# Patient Record
Sex: FEMALE | Race: WHITE
[De-identification: names, ages, dates, MRNs, and addresses within clinical notes are randomized per-mention and may not be internally consistent; named-entity substitution may affect disease eponyms.]

---

## 2019-07-08 ENCOUNTER — HOSPITAL ENCOUNTER (EMERGENCY)
Dept: HOSPITAL 11 - JP.ED | Age: 64
Discharge: OTHER INSTITUTION | End: 2019-07-08
Payer: OTHER GOVERNMENT

## 2019-07-08 DIAGNOSIS — Z79.899: ICD-10-CM

## 2019-07-08 DIAGNOSIS — Z88.5: ICD-10-CM

## 2019-07-08 DIAGNOSIS — Z88.8: ICD-10-CM

## 2019-07-08 DIAGNOSIS — K85.10: Primary | ICD-10-CM

## 2019-07-08 DIAGNOSIS — Z79.82: ICD-10-CM

## 2019-07-08 PROCEDURE — 87040 BLOOD CULTURE FOR BACTERIA: CPT

## 2019-07-08 PROCEDURE — 96376 TX/PRO/DX INJ SAME DRUG ADON: CPT

## 2019-07-08 PROCEDURE — 96375 TX/PRO/DX INJ NEW DRUG ADDON: CPT

## 2019-07-08 PROCEDURE — 82150 ASSAY OF AMYLASE: CPT

## 2019-07-08 PROCEDURE — 85025 COMPLETE CBC W/AUTO DIFF WBC: CPT

## 2019-07-08 PROCEDURE — 83690 ASSAY OF LIPASE: CPT

## 2019-07-08 PROCEDURE — 80053 COMPREHEN METABOLIC PANEL: CPT

## 2019-07-08 PROCEDURE — 96365 THER/PROPH/DIAG IV INF INIT: CPT

## 2019-07-08 PROCEDURE — 84484 ASSAY OF TROPONIN QUANT: CPT

## 2019-07-08 PROCEDURE — 99285 EMERGENCY DEPT VISIT HI MDM: CPT

## 2019-07-08 PROCEDURE — 83605 ASSAY OF LACTIC ACID: CPT

## 2019-07-08 PROCEDURE — 74177 CT ABD & PELVIS W/CONTRAST: CPT

## 2019-07-08 PROCEDURE — 36415 COLL VENOUS BLD VENIPUNCTURE: CPT

## 2019-07-08 NOTE — EDM.PDOC
ED HPI GENERAL MEDICAL PROBLEM





- General


Chief Complaint: Gastrointestinal Problem


Stated Complaint: VOMITING


Time Seen by Provider: 07/08/19 01:37


Source of Information: Reports: Patient


History Limitations: Reports: No Limitations





- History of Present Illness


INITIAL COMMENTS - FREE TEXT/NARRATIVE: 





63-year-old female arrives with severe upper epigastric and right upper 

quadrant abdominal pain for the past 4 hours. She is crying, extremely 

uncomfortable and having difficulty walking. She appears to be 

hyperventilating. It started when she was vomiting 2-3 hours after eating 

supper at 6 PM and since that time has had intractable severe pain. The 

symptoms started gradually. She thinks the pain is from "throwing up so hard". 

She has no chest pain, shortness of breath, palpitations or fever. She had some 

diarrhea yesterday, no bowel movement today. She feels her heartburn is 

"terrible".


Onset: Gradual


Duration: Hour(s): (4-5 hours)


Location: Reports: Abdomen


Associated Symptoms: Reports: Malaise, Nausea/Vomiting.  Denies: Chest Pain, 

Cough, Weakness


  ** epigastric/ abd


Pain Score (Numeric/FACES): 10





- Related Data


 Allergies











Allergy/AdvReac Type Severity Reaction Status Date / Time


 


acetaminophen [From Percocet] Allergy  Hives Verified 07/08/19 01:45


 


oxycodone [From Percocet] Allergy  Hives Verified 07/08/19 01:45











Home Meds: 


 Home Meds





Aspirin [Halfprin] 81 mg PO DAILY 07/08/19 [History]


Cholecalciferol (Vitamin D3) [Vitamin D3] 1,000 unit PO DAILY 07/08/19 [History]


Ibuprofen [Motrin] 600 mg PO Q6H PRN 07/08/19 [History]


Metoprolol Succinate [Toprol Xl] 50 mg PO DAILY 07/08/19 [History]


Olopatadine HCl [Pazeo] 1 drop TOP DAILY 07/08/19 [History]


Pantoprazole Sodium [Protonix] 40 mg PO DAILY 07/08/19 [History]


Simvastatin 20 mg PO BEDTIME 07/08/19 [History]


traZODone HCl [Trazodone HCl] 100 mg PO BEDTIME 07/08/19 [History]











ED ROS GENERAL





- Review of Systems


Review Of Systems: See Below


Constitutional: Reports: Malaise.  Denies: Fever, Chills


HEENT: Reports: No Symptoms


Respiratory: Denies: Shortness of Breath


Cardiovascular: Reports: Chest Pain (Epigastric pain)


GI/Abdominal: Reports: Abdominal Pain, Nausea, Vomiting.  Denies: Black Stool, 

Constipation, Diarrhea


: Reports: No Symptoms


Skin: Reports: Pallor, Diaphoresis


Neurological: Reports: No Symptoms





ED EXAM, GI/ABD





- Physical Exam


Exam: See Below


Exam Limited By: No Limitations


General Appearance: Alert, Severe Distress


Eyes: Bilateral: Normal Appearance (No obvious jaundice, good hydration)


Head: Atraumatic


Respiratory/Chest: No Respiratory Distress, Lungs Clear


Cardiovascular: Regular Rate, Rhythm.  No: Tachycardia


GI/Abdominal Exam: Guarding, Rebound, Tender, Other (Severe tenderness to 

palpation across the epigastric area with guarding and rebound)


Extremities: Normal Inspection.  No: Pedal Edema


Skin Exam: Warm, Other (Diaphoretic)





Course





- Vital Signs


Last Recorded V/S: 


 Last Vital Signs











Temp  97.1 F   07/08/19 01:41


 


Pulse  95   07/08/19 03:16


 


Resp  24 H  07/08/19 03:16


 


BP  154/100 H  07/08/19 03:16


 


Pulse Ox  97   07/08/19 03:16














- Orders/Labs/Meds


Orders: 


 Active Orders 24 hr











 Category Date Time Status


 


 CULTURE BLOOD [BC] Urgent Lab  07/08/19 03:05 Received


 


 CULTURE BLOOD [BC] Urgent Lab  07/08/19 03:10 Received


 


 Blood Culture x2 Reflex Set [OM.PC] Urgent Oth  07/08/19 03:00 Ordered











Labs: 


 Laboratory Tests











  07/08/19 07/08/19 07/08/19 Range/Units





  02:05 02:05 02:05 


 


WBC  13.7 H    (4.5-11.0)  K/uL


 


RBC  5.58 H    (3.30-5.50)  M/uL


 


Hgb  16.5 H    (12.0-15.0)  g/dL


 


Hct  48.8 H    (36.0-48.0)  %


 


MCV  88    (80-98)  fL


 


MCH  30    (27-31)  pg


 


MCHC  34    (32-36)  %


 


Plt Count  266    (150-400)  K/uL


 


Neut % (Auto)  83 H    (36-66)  %


 


Lymph % (Auto)  13 L    (24-44)  %


 


Mono % (Auto)  4    (2-6)  %


 


Eos % (Auto)  0 L    (2-4)  %


 


Baso % (Auto)  0    (0-1)  %


 


Sodium   141   (140-148)  mmol/L


 


Potassium   3.7   (3.6-5.2)  mmol/L


 


Chloride   103   (100-108)  mmol/L


 


Carbon Dioxide   27   (21-32)  mmol/L


 


Anion Gap   11.2   (5.0-14.0)  mmol/L


 


BUN   11   (7-18)  mg/dL


 


Creatinine   1.1 H   (0.6-1.0)  mg/dL


 


Est Cr Clr Drug Dosing   46.38   mL/min


 


Estimated GFR (MDRD)   50 L   (>60)  


 


Glucose   225 H   ()  mg/dL


 


Lactic Acid    3.9 H  (0.4-2.0)  mmol/L


 


Calcium   9.2   (8.5-10.1)  mg/dL


 


Total Bilirubin   2.1 H   (0.2-1.0)  mg/dL


 


AST   169 H   (15-37)  U/L


 


ALT   89 H   (12-78)  U/L


 


Alkaline Phosphatase   145 H   ()  U/L


 


Troponin I     (0.000-0.056)  ng/mL


 


Total Protein   7.1   (6.4-8.2)  g/dL


 


Albumin   3.8   (3.4-5.0)  g/dL


 


Globulin   3.3   (2.3-3.5)  g/dL


 


Albumin/Globulin Ratio   1.2   (1.2-2.2)  


 


Amylase   2453 H   ()  U/L


 


Lipase   33497 H   ()  U/L














  07/08/19 Range/Units





  02:05 


 


WBC   (4.5-11.0)  K/uL


 


RBC   (3.30-5.50)  M/uL


 


Hgb   (12.0-15.0)  g/dL


 


Hct   (36.0-48.0)  %


 


MCV   (80-98)  fL


 


MCH   (27-31)  pg


 


MCHC   (32-36)  %


 


Plt Count   (150-400)  K/uL


 


Neut % (Auto)   (36-66)  %


 


Lymph % (Auto)   (24-44)  %


 


Mono % (Auto)   (2-6)  %


 


Eos % (Auto)   (2-4)  %


 


Baso % (Auto)   (0-1)  %


 


Sodium   (140-148)  mmol/L


 


Potassium   (3.6-5.2)  mmol/L


 


Chloride   (100-108)  mmol/L


 


Carbon Dioxide   (21-32)  mmol/L


 


Anion Gap   (5.0-14.0)  mmol/L


 


BUN   (7-18)  mg/dL


 


Creatinine   (0.6-1.0)  mg/dL


 


Est Cr Clr Drug Dosing   mL/min


 


Estimated GFR (MDRD)   (>60)  


 


Glucose   ()  mg/dL


 


Lactic Acid   (0.4-2.0)  mmol/L


 


Calcium   (8.5-10.1)  mg/dL


 


Total Bilirubin   (0.2-1.0)  mg/dL


 


AST   (15-37)  U/L


 


ALT   (12-78)  U/L


 


Alkaline Phosphatase   ()  U/L


 


Troponin I  < 0.017  (0.000-0.056)  ng/mL


 


Total Protein   (6.4-8.2)  g/dL


 


Albumin   (3.4-5.0)  g/dL


 


Globulin   (2.3-3.5)  g/dL


 


Albumin/Globulin Ratio   (1.2-2.2)  


 


Amylase   ()  U/L


 


Lipase   ()  U/L











Meds: 


Medications














Discontinued Medications














Generic Name Dose Route Start Last Admin





  Trade Name Freq  PRN Reason Stop Dose Admin


 


Hydromorphone HCl  0.5 mg  07/08/19 01:45  07/08/19 01:48





  Dilaudid  IVPUSH  07/08/19 01:46  0.5 mg





  ONETIME ONE   Administration





     





     





     





     


 


Hydromorphone HCl  0.5 mg  07/08/19 02:49  07/08/19 02:53





  Dilaudid  IVPUSH  07/08/19 02:50  0.5 mg





  ONETIME ONE   Administration





     





     





     





     


 


Hydromorphone HCl  Confirm  07/08/19 02:50  07/08/19 02:53





  Dilaudid  Administered  07/08/19 02:51  Not Given





  Dose   





  0.5 mg   





  .ROUTE   





  .STK-MED ONE   





     





     





     





     


 


Sodium Chloride  1,000 mls @ 1,000 mls/hr  07/08/19 02:15  07/08/19 02:39





  Normal Saline  IV   1,000 mls/hr





  ASDIRECTED LUIS ALBERTO   Administration





     





     





     





     


 


Sodium Chloride  83 mls @ 4 mls/sec  07/08/19 02:20  07/08/19 02:27





  Normal Saline  IV  07/08/19 02:21  4 mls/sec





  ASDIRECTED STA   Administration





     





     





     





     


 


Lactated Ringer's  1,000 mls @ 1,000 mls/hr  07/08/19 03:00  07/08/19 02:55





  Ringers, Lactated  IV   1,000 mls/hr





  ASDIRECTED LUIS ALBERTO   Administration





     





     





     





     


 


Piperacillin Sod/Tazobactam  100 mls @ 100 mls/hr  07/08/19 03:00  07/08/19 03:

11





  Sod 4.5 gm/ Sodium Chloride  IV  07/08/19 03:59  100 mls/hr





  ONETIME ONE   Administration





     





     





     





     


 


Iopamidol  150 ml  07/08/19 02:30  07/08/19 02:27





  Isovue-300 (61%)  IV   150 ml





  .AS DIRECTED LUIS ALBERTO   Administration





     





     





     





     


 


Lorazepam  1 mg  07/08/19 01:54  07/08/19 02:02





  Ativan  IVPUSH  07/08/19 01:55  1 mg





  ONETIME ONE   Administration





     





     





     





     


 


Ondansetron HCl  4 mg  07/08/19 01:45  07/08/19 01:48





  Zofran  IVPUSH  07/08/19 01:46  4 mg





  ONETIME ONE   Administration





     





     





     





     














- Re-Assessments/Exams


Free Text/Narrative Re-Assessment/Exam: 





07/08/19 03:09


An IV was started and the patient was given 0.5 mg of IV Dilaudid and 4 mg of 

Zofran. CBC, CMP, amylase, lipase, lactic acid and troponin were obtained. An 

urgent CT the abdomen and pelvis with IV contrast was obtained and the 

creatinine returned normal. This revealed extremely inflammatory pancreatitis, 

lipase returned over 14,000 and amylase over 2400. Bilirubin was 2.1, white 

count 13,700 and elevation of alkaline phosphatase and AST and ALT were also 

present. An additional 0.5 mg of IV Dilaudid was given and the patient was 

bolused with lactated Ringer's. There was a delay in getting a formal CT reading

, however it was obvious that severe pancreatitis was present in the 

gallbladder was also completely packed with stones. I discussed this with our 

surgeon on-call and it was felt because of the significant gallstone 

pancreatitis, that gastroenterology, interventional radiology and ICU together 

should be combined to help this patient so [facility] was called. Dr. Galarza, hospitalist service kindly agreed to see the patient. Blood cultures 

were obtained, she was given 4.5 g of IV Zofran and continued to be bolused 

with lactated Ringer's. This patient was flown to [place] because of the 

seriousness of her illness.





07/08/19 03:13


Patient was accepted for transfer at 3 AM. At time of dictation a formal CT 

report had still not been obtained, the images were sent to [place] for their 

review and when a formal report is available that will be faxed for 

completeness.





Departure





- Departure


Time of Disposition: 03:46


Disposition: DC/Tfer to Other 70


Condition: Serious


Clinical Impression: 


 Abdominal pain





Pancreatitis due to biliary obstruction


Qualifiers:


 Chronicity: acute Acute pancreatitis complication: unspecified Qualified Code(s

): K85.10 - Biliary acute pancreatitis without necrosis or infection








- Discharge Information


Referrals: 


Paulina Fried PA [Primary Care Provider] - 


Forms:  ED Department Discharge


Care Plan Goals: 


Patient will be urgently transported to [facility] in [place] for 

gastroenterology, surgical, and intensive care consultations for treatment of 

acute obstructive pancreatitis.





- My Orders


Last 24 Hours: 


My Active Orders





07/08/19 03:00


Blood Culture x2 Reflex Set [OM.PC] Urgent 





07/08/19 03:05


CULTURE BLOOD [BC] Urgent 





07/08/19 03:10


CULTURE BLOOD [BC] Urgent 














- Assessment/Plan


Last 24 Hours: 


My Active Orders





07/08/19 03:00


Blood Culture x2 Reflex Set [OM.PC] Urgent 





07/08/19 03:05


CULTURE BLOOD [BC] Urgent 





07/08/19 03:10


CULTURE BLOOD [BC] Urgent

## 2019-07-08 NOTE — CRLCT
INDICATION:



Severe upper abdominal pain 



TECHNIQUE:



CT abdomen and pelvis acquired with 150 cc Isovue 300 IV contrast.



COMPARISON:



None 



FINDINGS:



Lower chest: Unremarkable.  



Liver: Hepatic steatosis. 



Spleen: Unremarkable.  



Pancreas: Peripancreatic fat stranding. Homogeneous enhancement of the 

pancreas. No peripancreatic fluid collection. The splenic vein is patent. 



Gallbladder and bile ducts: Chololithiasis. 



Adrenal glands: Unremarkable.  



Kidneys: Unremarkable.  



GI tract: Mild gastric wall thickening. Appendix is normal.  



Vascular structures: Unremarkable.  



Lymph nodes: Unremarkable.  



Pelvic organs: Trace free fluid in the pelvis, likely physiologic. 



Bones: Unremarkable for age.  



IMPRESSION:



Acute pancreatitis. No peripancreatic fluid collection. 



Cholelithiasis. 



Gastric wall thickening suggest gastritis. 



Hepatic steatosis.



Please note that all CT scans at this facility use dose modulation, 

iterative reconstruction, and/or weight-based dosing when appropriate to 

reduce radiation dose to as low as reasonably achievable.



Dictated by Mi Gomez MD @ Jul 8 2019  3:23AM



Signed by Dr. Mi Gomez @ Jul 8 2019  3:23AM